# Patient Record
Sex: MALE | Race: WHITE | Employment: UNEMPLOYED | URBAN - METROPOLITAN AREA
[De-identification: names, ages, dates, MRNs, and addresses within clinical notes are randomized per-mention and may not be internally consistent; named-entity substitution may affect disease eponyms.]

---

## 2024-08-01 RX ORDER — CETIRIZINE HYDROCHLORIDE 10 MG/1
10 TABLET ORAL PRN
COMMUNITY
Start: 2014-04-17

## 2024-08-01 RX ORDER — SAW/PYGEUM/BETA/HERB/D3/B6/ZN 30 MG-25MG
CAPSULE ORAL PRN
COMMUNITY

## 2024-08-01 NOTE — PROGRESS NOTES
[facility] PRE-OPERATIVE INSTRUCTIONS    Day of Procedure:  8/16              Arrival time:    11            Surgery time:1230    Take the following medications with a sip of water:  Follow your MD/Surgeons pre-procedure instructions regarding your medications     Do not eat or drink anything after 12:00 midnight prior to your surgery.  This includes water chewing gum, mints and ice chips.   You may brush your teeth and gargle the morning of your surgery, but do not swallow the water     Please see your family doctor/pediatrician for a history and physical and/or concerning medications.   Bring any test results/reports from your physicians office.   If you are under the care of a heart doctor or specialist doctor, please be aware that you may be asked to them for clearance    You may be asked to stop blood thinners such as Coumadin, Plavix, Fragmin, Lovenox, etc., or any anti-inflammatories such as:  Aspirin, Ibuprofen, Advil, Naproxen prior to your surgery.    We also ask that you stop any OTC medications such as fish oil, vitamin E, glucosamine, garlic, Multivitamins, COQ 10, etc.    We ask that you do not smoke 24 hours prior to surgery  We ask that you do not  drink any alcoholic beverages 24 hours prior to surgery     You must make arrangements for a responsible adult to take you home after your surgery.    For your safety you will not be allowed to leave alone or drive yourself home.  Your surgery will be cancelled if you do not have a ride home.     Also for your safety, you must have someone stay with you the first 24 hours after your surgery.     A parent or legal guardian must accompany a child scheduled for surgery and plan to stay at the hospital until the child is discharged.    Please do not bring other children with you.    For your comfort, please wear simple loose fitting clothing to the hospital.  Please do not bring valuables.    Do not wear any make-up or nail polish on your fingers  admission screening and protocol:       * Admitted with diarrhea?                         [] YES    [x]  NO     *Prior history of C-Diff. In last 3 months? [] YES    [x]  NO     *Antibiotic use in the past 6-8 weeks?      [x]  NO    []  YES                 If yes, which ANTIBIOTIC AND REASON______     *Prior hospitalization or nursing home in the last month? []  YES    [x]  NO        SAFETY FIRST..call before you fall

## 2024-08-01 NOTE — PROGRESS NOTES
[facility] Pre-Admission Testing Electronic Communication Worksheet for OR/ENDO Procedures        Patient: Jun De Los Santos    DOS: 8/16    Transportation Confirmed: [x] YES    []  NO    History and Physical:  [] YES    []  NO  [x] N/A  If yes, please list doctor or Urgent Care and date of H&P:     Additional Clearance(Cardiac, Pulmonary, etc):  [] YES    [x]  NO    Pre-Admission Testing Visit:  [] YES    [x]  NO If no, do labs/testing need to be done DOS?  [] YES    [x]  NO    Medication Reconciliation Complete:  [x] YES    []  NO        Additional Notes:                Interview Complete: [x] YES    []  NO          Liliana Alanis RN  1:22 PM

## 2024-08-13 ENCOUNTER — ANESTHESIA EVENT (OUTPATIENT)
Dept: ENDOSCOPY | Age: 79
End: 2024-08-13
Payer: MEDICARE

## 2024-08-16 ENCOUNTER — ANESTHESIA (OUTPATIENT)
Dept: ENDOSCOPY | Age: 79
End: 2024-08-16
Payer: MEDICARE

## 2024-08-16 ENCOUNTER — HOSPITAL ENCOUNTER (OUTPATIENT)
Age: 79
Setting detail: OUTPATIENT SURGERY
Discharge: HOME | End: 2024-08-16
Attending: INTERNAL MEDICINE | Admitting: INTERNAL MEDICINE
Payer: MEDICARE

## 2024-08-16 VITALS
DIASTOLIC BLOOD PRESSURE: 67 MMHG | WEIGHT: 130 LBS | RESPIRATION RATE: 16 BRPM | HEART RATE: 57 BPM | TEMPERATURE: 97.2 F | OXYGEN SATURATION: 98 % | HEIGHT: 67 IN | BODY MASS INDEX: 20.4 KG/M2 | SYSTOLIC BLOOD PRESSURE: 132 MMHG

## 2024-08-16 PROCEDURE — 7100000000 HC PACU RECOVERY - FIRST 15 MIN: Performed by: INTERNAL MEDICINE

## 2024-08-16 PROCEDURE — 3700000001 HC ADD 15 MINUTES (ANESTHESIA): Performed by: INTERNAL MEDICINE

## 2024-08-16 PROCEDURE — 2500000003 HC RX 250 WO HCPCS

## 2024-08-16 PROCEDURE — 3609017700 HC EGD DILATION GASTRIC/DUODENAL STRICTURE: Performed by: INTERNAL MEDICINE

## 2024-08-16 PROCEDURE — 7100000010 HC PHASE II RECOVERY - FIRST 15 MIN: Performed by: INTERNAL MEDICINE

## 2024-08-16 PROCEDURE — 7100000011 HC PHASE II RECOVERY - ADDTL 15 MIN: Performed by: INTERNAL MEDICINE

## 2024-08-16 PROCEDURE — 2709999900 HC NON-CHARGEABLE SUPPLY: Performed by: INTERNAL MEDICINE

## 2024-08-16 PROCEDURE — C1726 CATH, BAL DIL, NON-VASCULAR: HCPCS | Performed by: INTERNAL MEDICINE

## 2024-08-16 PROCEDURE — 6360000002 HC RX W HCPCS

## 2024-08-16 PROCEDURE — 7100000001 HC PACU RECOVERY - ADDTL 15 MIN: Performed by: INTERNAL MEDICINE

## 2024-08-16 PROCEDURE — 2580000003 HC RX 258: Performed by: STUDENT IN AN ORGANIZED HEALTH CARE EDUCATION/TRAINING PROGRAM

## 2024-08-16 PROCEDURE — 3700000000 HC ANESTHESIA ATTENDED CARE: Performed by: INTERNAL MEDICINE

## 2024-08-16 RX ORDER — SODIUM CHLORIDE 9 MG/ML
INJECTION, SOLUTION INTRAVENOUS PRN
Status: DISCONTINUED | OUTPATIENT
Start: 2024-08-16 | End: 2024-08-16 | Stop reason: HOSPADM

## 2024-08-16 RX ORDER — SODIUM CHLORIDE 0.9 % (FLUSH) 0.9 %
5-40 SYRINGE (ML) INJECTION EVERY 12 HOURS SCHEDULED
Status: DISCONTINUED | OUTPATIENT
Start: 2024-08-16 | End: 2024-08-16 | Stop reason: HOSPADM

## 2024-08-16 RX ORDER — PROPOFOL 10 MG/ML
INJECTION, EMULSION INTRAVENOUS PRN
Status: DISCONTINUED | OUTPATIENT
Start: 2024-08-16 | End: 2024-08-16 | Stop reason: SDUPTHER

## 2024-08-16 RX ORDER — SODIUM CHLORIDE 0.9 % (FLUSH) 0.9 %
5-40 SYRINGE (ML) INJECTION PRN
Status: DISCONTINUED | OUTPATIENT
Start: 2024-08-16 | End: 2024-08-16 | Stop reason: HOSPADM

## 2024-08-16 RX ORDER — NALOXONE HYDROCHLORIDE 0.4 MG/ML
INJECTION, SOLUTION INTRAMUSCULAR; INTRAVENOUS; SUBCUTANEOUS PRN
Status: DISCONTINUED | OUTPATIENT
Start: 2024-08-16 | End: 2024-08-16 | Stop reason: HOSPADM

## 2024-08-16 RX ORDER — IPRATROPIUM BROMIDE AND ALBUTEROL SULFATE 2.5; .5 MG/3ML; MG/3ML
1 SOLUTION RESPIRATORY (INHALATION)
Status: DISCONTINUED | OUTPATIENT
Start: 2024-08-16 | End: 2024-08-16 | Stop reason: HOSPADM

## 2024-08-16 RX ORDER — LIDOCAINE HYDROCHLORIDE 20 MG/ML
INJECTION, SOLUTION EPIDURAL; INFILTRATION; INTRACAUDAL; PERINEURAL PRN
Status: DISCONTINUED | OUTPATIENT
Start: 2024-08-16 | End: 2024-08-16 | Stop reason: SDUPTHER

## 2024-08-16 RX ORDER — PANTOPRAZOLE SODIUM 40 MG/1
40 TABLET, DELAYED RELEASE ORAL
Qty: 30 TABLET | Refills: 5 | Status: SHIPPED | OUTPATIENT
Start: 2024-08-16

## 2024-08-16 RX ORDER — ONDANSETRON 2 MG/ML
4 INJECTION INTRAMUSCULAR; INTRAVENOUS
Status: DISCONTINUED | OUTPATIENT
Start: 2024-08-16 | End: 2024-08-16 | Stop reason: HOSPADM

## 2024-08-16 RX ADMIN — SODIUM CHLORIDE: 9 INJECTION, SOLUTION INTRAVENOUS at 12:45

## 2024-08-16 RX ADMIN — LIDOCAINE HYDROCHLORIDE 80 MG: 20 INJECTION, SOLUTION EPIDURAL; INFILTRATION; INTRACAUDAL; PERINEURAL at 12:53

## 2024-08-16 RX ADMIN — PROPOFOL 80 MG: 10 INJECTION, EMULSION INTRAVENOUS at 12:53

## 2024-08-16 RX ADMIN — PROPOFOL 120 MCG/KG/MIN: 10 INJECTION, EMULSION INTRAVENOUS at 12:54

## 2024-08-16 ASSESSMENT — ENCOUNTER SYMPTOMS: SHORTNESS OF BREATH: 0

## 2024-08-16 ASSESSMENT — PAIN SCALES - GENERAL: PAINLEVEL_OUTOF10: 0

## 2024-08-16 ASSESSMENT — PAIN - FUNCTIONAL ASSESSMENT: PAIN_FUNCTIONAL_ASSESSMENT: 0-10

## 2024-08-16 NOTE — H&P
[facility]   Attending Pre-operative History and Physical      PROCEDURE:  EGD    Indication:The patient is a 79 y.o. male presents for an upper endoscopy and EUS    Past Medical History:    Past Medical History:   Diagnosis Date    Atrial fib/flutter, transient (HCC)     Hypertension     Seasonal allergies     Wears partial dentures       Past Surgical History:    Past Surgical History:   Procedure Laterality Date    CHOLECYSTECTOMY      COLONOSCOPY      ENDOSCOPY, COLON, DIAGNOSTIC      HERNIA REPAIR      TONSILLECTOMY        Medications Prior to Admission:   Prior to Admission medications    Medication Sig Start Date End Date Taking? Authorizing Provider   apixaban (ELIQUIS) 5 MG TABS tablet Take 1 tablet by mouth daily   Yes Libai Powell MD   cetirizine (ZYRTEC) 10 MG tablet Take 1 tablet by mouth as needed 4/17/14  Yes Libia Powell MD   Alpha Lipoic Acid 200 MG CAPS by Other route as needed    Libia Powell MD   bisoprolol-hydrochlorothiazide (ZIAC) 10-6.25 MG per tablet Take 1 tablet by mouth daily.      Libia Powell MD        Allergies:  Patient has no known allergies.  History of allergic reaction to anesthesia:  No    Social History:   Social History     Socioeconomic History    Marital status:      Spouse name: Not on file    Number of children: Not on file    Years of education: Not on file    Highest education level: Not on file   Occupational History    Not on file   Tobacco Use    Smoking status: Passive Smoke Exposure - Never Smoker    Smokeless tobacco: Not on file   Vaping Use    Vaping status: Never Used   Substance and Sexual Activity    Alcohol use: Yes    Drug use: Never    Sexual activity: Not Currently   Other Topics Concern    Not on file   Social History Narrative    Not on file     Social Determinants of Health     Financial Resource Strain: Not on file   Food Insecurity: Not on file   Transportation Needs: Not on file   Physical Activity: Not

## 2024-08-16 NOTE — OP NOTE
Esophagogastroduodenoscopy Note    Patient:   Jun De Los Santos    :    1945    Facility:   [facility] [Outpatient]   Referring/PCP: Emanuel Garcia MD    Procedure:   Esophagogastroduodenoscopy   Date:     2024   Endoscopist:  Neri Brown MD     Preoperative Diagnosis:    esophageal stricture, possible subepithelial lesion in the antrum    Postoperative Diagnosis:  1.  Stricture at the gastroesophageal junction dilated with a through-the-scope balloon to 13.5 mm.  2.  Hiatal hernia  3.  Close examination of the antrum revealed a small area of mucosal edema was associated erosion.  The appearance is most consistent with mucosal edema as opposed to a subepithelial lesion.  Therefore an endoscopic ultrasound was not performed.    Anesthesia:  MAC    Estimated blood loss: minimal    Complications: None    Specimen: no    Instrument: ,    Description of Procedure:  Informed consent was obtained from the patient after explanation of the procedure including indications, description of the procedure,  benefits and possible risks and complications of the procedure, and alternatives. Questions were answered.  The patient's history was reviewed and a directed physical examination was performed prior to the procedure.    Patient was monitored throughout the procedure with pulse oximetry and periodic assessment of vital signs. Patient was sedated as noted above. With the patient in the left lateral decubitus position, the Olympus videoendoscope was placed in the patient's mouth and under direct visualization passed into the esophagus.  Visualization of the esophagus, stomach, and duodenum was performed during both introduction and withdrawal of the endoscope and retroflexed view of the proximal stomach was obtained. The scope was passed to the 2nd portion of the duodenum.  The patient tolerated the procedure well and was taken to the recovery area in good  condition.    Findings: The mucosa in the esophagus is normal.  The stricture was present at the gastroesophageal junction.  There was no clear evidence of active reflux esophagitis.  We could pass the  upper endoscope through the stricture into the stomach.  The patient has a hiatal hernia.  Changes of gastritis were noted.  Close examination of the antrum and body of the stomach revealed small area of mucosal edema in the antrum with an associated mucosal erosion.  It is probably the site previously described as possibly a subepithelial lesion.  Given its appearance today, it is most suggestive of mucosal edema and erosion as opposed to a subepithelial lesion.  Therefore endoscopic ultrasound was not performed.  The mucosa in the duodenal bulb and descending duodenum is normal.  Subsequently, the stricture at the GE junction was sequentially dilated to 12 mm and then 13.5 mm with a through-the-scope balloon.  A shallow tear was present post dilation.  Further dilation was not attempted.      Recommendations:  1.  Start pantoprazole 40 mg once a day.  Will send a prescription  2.  Consider repeat upper endoscopy and dilation if symptoms of dysphagia.  3.  No further evaluation required regarding the possible subepithelial lesion.    Neri Brown MD, MD

## 2024-08-16 NOTE — ANESTHESIA PRE PROCEDURE
Department of Anesthesiology  Preprocedure Note       Name:  Jun De Los Santos   Age:  79 y.o.  :  1945                                          MRN:  [id number]         Date:  2024      Surgeon: Surgeon(s):  Neri Brown MD    Procedure: Procedure(s):  ESOPHAGOGASTRODUODENOSCOPY    Medications prior to admission:   Prior to Admission medications    Medication Sig Start Date End Date Taking? Authorizing Provider   apixaban (ELIQUIS) 5 MG TABS tablet Take 1 tablet by mouth daily   Yes Libia Powell MD   cetirizine (ZYRTEC) 10 MG tablet Take 1 tablet by mouth as needed 14  Yes Libia Powell MD   Alpha Lipoic Acid 200 MG CAPS by Other route as needed    ProviderLibia MD   bisoprolol-hydrochlorothiazide (ZIAC) 10-6.25 MG per tablet Take 1 tablet by mouth daily.      ProviderLibia MD       Current medications:    Current Facility-Administered Medications   Medication Dose Route Frequency Provider Last Rate Last Admin    sodium chloride flush 0.9 % injection 5-40 mL  5-40 mL IntraVENous 2 times per day Tim Fernández MD        sodium chloride flush 0.9 % injection 5-40 mL  5-40 mL IntraVENous PRN Tim Fernández MD        0.9 % sodium chloride infusion   IntraVENous PRN Tim Fernández MD        ipratropium 0.5 mg-albuterol 2.5 mg (DUONEB) nebulizer solution 1 Dose  1 Dose Inhalation Once PRN Tim Fernández MD           Allergies:  No Known Allergies    Problem List:  There is no problem list on file for this patient.      Past Medical History:        Diagnosis Date    Atrial fib/flutter, transient (HCC)     Hypertension     Seasonal allergies     Wears partial dentures        Past Surgical History:        Procedure Laterality Date    CHOLECYSTECTOMY      COLONOSCOPY      ENDOSCOPY, COLON, DIAGNOSTIC      HERNIA REPAIR      TONSILLECTOMY         Social History:    Social History     Tobacco Use    Smoking status: Passive Smoke Exposure - Never Smoker

## 2024-08-16 NOTE — PROGRESS NOTES
Verbal and written discharge instructions were given to the patient and family, patient and family verbalize understanding of discharge instructions including medications orders for home and possible side effects associated with them. Patient instructed and verbalizes understanding to call the doctor listed if they should have any complications or worsening symptoms. Verbalizes understanding about follow-up appointments. D/C IV patient tolerated well, no signs of bleeding. Patient discharged home with all belongings.

## 2024-08-16 NOTE — DISCHARGE INSTRUCTIONS
Endoscopy Discharge Instructions    [facility] with any questions or concerns.    You may be drowsy or lightheaded after receiving sedation.  DO NOT operate  a vehicle (automobile, bicycle, motorcycle, machinery, or power tools), no  alcoholic beverages, and do not make any important decisions today.                 Plan on bed rest or quiet relaxation today.  Resume normal activities in the morning.     Resume normal activity tomorrow unless otherwise advised by your physician.            Eat a light first meal, avoiding spicy and fatty foods, then resume normal diet unless  you are told otherwise by your physician.    If the intravenous medication site is painful, apply warm compresses on the site until the soreness is relieved and elevate the arm above the heart. Call your physician if no improvement  in 2-3 days.       POSSIBLE SYMPTOMS TO WATCH:     1. fever (greater than 100) 5. increased abdominal bloating   2. severe pain   6. excessive bleeding   3. nausea and vomiting  7. chest pain   4. chills    8. shortness of breath       [facility] if these problems occur     Expected as normal and remedies:  Sore throat: use over the counter throat lozenges or gargle with warm salt water.  Redness or soreness at the IV site: apply warm compress  Gaseous discomfort: belching or passing flatus (gas).    Start pantoprazole 40 mg once a day.  Repeat upper endoscopy if symptoms of difficulty swallowing.      Neri Brown MD    With questions or concerns call Dr Brown at .

## 2024-08-16 NOTE — ANESTHESIA POSTPROCEDURE EVALUATION
Department of Anesthesiology  Postprocedure Note    Patient: Jun De Los Santos  MRN: [id number]  YOB: 1945  Date of evaluation: 8/16/2024    Procedure Summary       Date: 08/16/24 Room / Location: [place] [id number] / [facility]    Anesthesia Start: 1246 Anesthesia Stop: 1305    Procedure: ESOPHAGOGASTRODUODENOSCOPY DILATION BALLOON Diagnosis:       Loss of weight      (Loss of weight [R63.4])    Surgeons: eNri Brown MD Responsible Provider: Jeanne House MD    Anesthesia Type: MAC ASA Status: 3            Anesthesia Type: MAC    Catina Phase I: Catina Score: 10    Catina Phase II: Catina Score: 10    Anesthesia Post Evaluation    Patient location during evaluation: PACU  Patient participation: complete - patient participated  Level of consciousness: awake and alert  Airway patency: patent  Nausea & Vomiting: no nausea and no vomiting  Cardiovascular status: hemodynamically stable  Respiratory status: acceptable  Hydration status: stable  Pain management: adequate    No notable events documented.

## (undated) DEVICE — BITE BLOCK ENDOSCP AD 60 FR W/ ADJ STRP PLAS GRN BLOX

## (undated) DEVICE — ENDOSCOPY KIT: Brand: MEDLINE INDUSTRIES, INC.

## (undated) DEVICE — DEVICE INFL 60ML 15ATM DISPOSABLE STERIFLATE CRE

## (undated) DEVICE — ESOPHAGEAL/PYLORIC/COLONIC/BILIARY WIREGUIDED BALLOON DILATATION CATHETER: Brand: CRE™ PRO